# Patient Record
Sex: FEMALE | Race: WHITE | Employment: UNEMPLOYED | URBAN - METROPOLITAN AREA
[De-identification: names, ages, dates, MRNs, and addresses within clinical notes are randomized per-mention and may not be internally consistent; named-entity substitution may affect disease eponyms.]

---

## 2017-01-16 ENCOUNTER — NURSE ONLY (OUTPATIENT)
Dept: LACTATION | Facility: HOSPITAL | Age: 37
End: 2017-01-16
Attending: OBSTETRICS & GYNECOLOGY
Payer: MEDICAID

## 2017-01-16 DIAGNOSIS — O92.79 INSUFFICIENT LACTATION: ICD-10-CM

## 2017-01-16 DIAGNOSIS — O92.5 SUPPRESSED LACTATION WITH INFANT BREAST ATTACHMENT DIFFICULTY: Primary | ICD-10-CM

## 2017-01-16 PROCEDURE — 99214 OFFICE O/P EST MOD 30 MIN: CPT

## 2017-01-16 NOTE — PATIENT INSTRUCTIONS
Breastfeeding suggestions to increase milk supply    Kangaroo mother care: Snuggle with your baby in skin to skin contact. This helps to wake a sleepy baby and increases your milk supply. Massage your breasts before nursing or pumping.   Practice relax instructions. These are additional suggestions only. • This thin silicone nipple shield (size 20mm) has been recommended to assist your baby to latch on to the breast or for protection of your nipples while your baby learns to breastfeed correctly.   • Use swallowing slows on the first side, repeat this process on the other breast.   • If needed, trickle drops of your expressed milk or formula onto the nipple to encourage your baby to latch on.  If your baby becomes upset or has not latched on within 20 minut baby’s weight should be checked 1-2 times each week while using a nipple shield. Concentrate on increasing milk supply and reintroducing Avrey to breastfeeding with skin to skin, nuzzling and attempting latching. F/U within 1 week.

## 2017-01-25 ENCOUNTER — NURSE ONLY (OUTPATIENT)
Dept: LACTATION | Facility: HOSPITAL | Age: 37
End: 2017-01-25
Attending: OBSTETRICS & GYNECOLOGY
Payer: MEDICAID

## 2017-01-25 DIAGNOSIS — IMO0002 INEFFECTIVE BREAST FEEDING: Primary | ICD-10-CM

## 2017-01-25 PROCEDURE — 99213 OFFICE O/P EST LOW 20 MIN: CPT

## 2017-01-25 NOTE — PATIENT INSTRUCTIONS
Latoya Henson weighs 8lbs 5.4oz (dressed) today, January 25th, an increase of 0.7oz since last visit, 9 days ago. Marshall  Kam 5X with 5 supplements of Enfaport, 2-3oz, in the last 24 hours. Kam latches bilaterally and transfers 8ml.  She is supplemented

## 2017-01-30 ENCOUNTER — TELEPHONE (OUTPATIENT)
Dept: LACTATION | Facility: HOSPITAL | Age: 37
End: 2017-01-30

## 2017-01-30 NOTE — TELEPHONE ENCOUNTER
Issues Identified: (actual or potential)  Supplementation, Inadequate feeding, Inadequate supply and Pumping Issues    Feeding Assessment  Marshall verbalizes she breastfeeds infant max 10-15 minutes/breastfeed and supplements with Enfaport minimum 8-12X/day, Tom Blake reports Madonna Clay has seen her Cardiologist today and plans a F/U with Pediatrician this week. Follow Up  Call back as needed   F/U LC [facility] scheduled for 1/31/17  F/U with Pediatrician for 2/1/17    Mother's response  Mother verbalized understanding.

## 2017-01-31 ENCOUNTER — TELEPHONE (OUTPATIENT)
Dept: LACTATION | Facility: HOSPITAL | Age: 37
End: 2017-01-31

## 2017-01-31 ENCOUNTER — NURSE ONLY (OUTPATIENT)
Dept: LACTATION | Facility: HOSPITAL | Age: 37
End: 2017-01-31
Attending: FAMILY MEDICINE
Payer: MEDICAID

## 2017-01-31 DIAGNOSIS — O92.3 FAILURE OF LACTATION, POSTPARTUM CONDITION OR COMPLICATION: Primary | ICD-10-CM

## 2017-01-31 PROCEDURE — 99212 OFFICE O/P EST SF 10 MIN: CPT

## 2017-01-31 NOTE — TELEPHONE ENCOUNTER
Issues Identified: (actual or potential)  Supplementation, Inadequate feeding, Inadequate supply and Maternal Medication Zoloft questions    Infant Assessment  See infant charting    Infant Growth  Birth weight 6# 227 [facility] discharge weight 6 # 2 oz   Ot

## 2017-01-31 NOTE — PROGRESS NOTES
LACTATION NOTE - MOTHER           Problems identified  Problems identified: Milk supply not WNL; Knowledge deficit  Milk supply not WNL: Hypogalactia  Problems Identified Other:  Hx breast biopsy to left breast, benign    Maternal history  Other/comment: hx

## 2017-02-01 NOTE — PROGRESS NOTES
Received phone message around 5 pm to call Shelbie Mata at her home number. She had numerous questions about the use of galactagogues and potential effects of same on Baby's cardiac issues.  She also asked about her intake of vitamins, and whether that could affec

## (undated) NOTE — MR AVS SNAPSHOT
[facility]  [address] [facility] [address]  [phone]               Thank you for choosing us for your health care visit with 1 Eddy Nash.   We are glad to serve you and happy to provide you with this summary · If needed, gently draw chin down lower to deepen latch. Is baby taking enough breastmilk? · Swallowing with most sucks (every 1-3 sucks) until satisfied at least 8-12 times every 24 hours.   · Compressing the breast when your baby sucks can increase m the shield, centered over the                 nipple and flip the shield right side out, drawing your nipple and areola into the                shield as much as possible.   ? Massage breasts and if possible, hand express  some breastmilk into the nipple sh flow bottle with 1 to 2 +oz to satisfaction. After feeding your baby:  ? Pump your breasts for 10-15 minutes using a hospital grade rental breast pump, after most daytime feedings.  This may help maintain adequate milk supply while using the [place]  [id number]    Diagnoses:  Suppressed lactation with infant breast attachment difficulty   Insufficient lactation   Order:  Op Referral To Lactation Clinic          Referral Orders      Normal Orders This Visit    OP REFERRAL TO LACTATION CLIN Water is best for hydration Fast food. Eat at home when possible     Tips for increasing your physical activity – Adults who are physically active are less likely to develop some chronic diseases than adults who are inactive.      HOW TO GET STARTED: HOW

## (undated) NOTE — MR AVS SNAPSHOT
[facility]  [address] [facility] [address]  [phone]               Thank you for choosing us for your health care visit with Jenny Allred.   We are glad to serve you and happy to provide you with this summary medical emergencies, dial 911.            Visit [facility] online at  [email or web address]

## (undated) NOTE — MR AVS SNAPSHOT
[facility]  [address] [facility] [address]  [phone]               Thank you for choosing us for your health care visit with Ramila Valdez.   We are glad to serve you and happy to provide you with this summary OP REFERRAL TO LACTATION CLINIC    Complete by:  As directed    Assoc Dx:   Ineffective breast feeding [P92.5]                 Referral Details     Referred By    Referred To    [facility] [address]   [place] [address] [id number]    Diagnoses:  Ine